# Patient Record
Sex: FEMALE | Race: WHITE | NOT HISPANIC OR LATINO | Employment: UNEMPLOYED | URBAN - NONMETROPOLITAN AREA
[De-identification: names, ages, dates, MRNs, and addresses within clinical notes are randomized per-mention and may not be internally consistent; named-entity substitution may affect disease eponyms.]

---

## 2017-08-18 ENCOUNTER — OFFICE VISIT (OUTPATIENT)
Dept: OTOLARYNGOLOGY | Facility: CLINIC | Age: 37
End: 2017-08-18

## 2017-08-18 VITALS
WEIGHT: 182 LBS | DIASTOLIC BLOOD PRESSURE: 96 MMHG | HEIGHT: 63 IN | SYSTOLIC BLOOD PRESSURE: 137 MMHG | TEMPERATURE: 97.7 F | BODY MASS INDEX: 32.25 KG/M2 | HEART RATE: 88 BPM

## 2017-08-18 DIAGNOSIS — J35.8 TONSILLAR CYST: Primary | ICD-10-CM

## 2017-08-18 PROCEDURE — 99203 OFFICE O/P NEW LOW 30 MIN: CPT | Performed by: PHYSICIAN ASSISTANT

## 2017-08-18 RX ORDER — PHENTERMINE HYDROCHLORIDE 37.5 MG/1
37.5 CAPSULE ORAL EVERY MORNING
COMMUNITY

## 2017-08-18 NOTE — PROGRESS NOTES
Patient Care Team:  MÓNICA Oliva as PCP - General (Family Medicine)  YOGI Silva as Physician Assistant (Otolaryngology)    Chief Complaint   Patient presents with   • lesion on back of throat        Subjective     Paula Desir is a 37 y.o. female who presents for evaluation.  HPI Comments: Patient presents for evaluation of throat problems. The patient has noted a lesion on the back of the throat about two weeks ago. She thought it may be a tonsil stone and tried to push it out without success. She does have a tobacco use history, but quit smoking over a year ago. The patient does state she can feel it in the back of her throat at times, but is is mild. No other concerns at this time.       Review of Systems  Review of Systems   Constitutional: Negative for activity change, appetite change, chills, diaphoresis, fatigue, fever and unexpected weight change.   HENT: Negative for congestion, dental problem, drooling, ear discharge, ear pain, facial swelling, hearing loss, mouth sores, nosebleeds, postnasal drip, rhinorrhea, sinus pressure, sneezing, sore throat, tinnitus, trouble swallowing and voice change.         Lesion on back of throat   Eyes: Negative.    Respiratory: Negative.    Cardiovascular: Negative.    Gastrointestinal: Negative.    Endocrine: Negative.    Skin: Negative.    Allergic/Immunologic: Negative for environmental allergies, food allergies and immunocompromised state.   Neurological: Negative.    Hematological: Negative.    Psychiatric/Behavioral: Negative.        History  History reviewed. No pertinent past medical history.  Past Surgical History:   Procedure Laterality Date   • ABDOMINAL SURGERY      tummy tuck   • APPENDECTOMY     •  SECTION     • CHOLECYSTECTOMY     • HYSTERECTOMY       Family History   Problem Relation Age of Onset   • Hypertension Mother      Social History   Substance Use Topics   • Smoking status: Former Smoker   • Smokeless tobacco:  None   • Alcohol use Yes      Comment: socially       Current Outpatient Prescriptions:   •  phentermine 37.5 MG capsule, Take 37.5 mg by mouth Every Morning., Disp: , Rfl:   Allergies:  Hepatitis b virus vaccine and Influenza vaccines    Objective     Vital Signs  Temp:  [97.7 °F (36.5 °C)] 97.7 °F (36.5 °C)  Heart Rate:  [88] 88  BP: (137)/(96) 137/96    Physical Exam:  Physical Exam   Constitutional: She is oriented to person, place, and time. Vital signs are normal. She appears well-developed and well-nourished. No distress.   HENT:   Head: Normocephalic and atraumatic.   Right Ear: Hearing, tympanic membrane, external ear and ear canal normal.   Left Ear: Hearing, tympanic membrane, external ear and ear canal normal.   Nose: Nose normal. No mucosal edema, rhinorrhea, nasal deformity or septal deviation.   Mouth/Throat: Uvula is midline, oropharynx is clear and moist and mucous membranes are normal. No oral lesions. No trismus in the jaw. No dental abscesses or uvula swelling. No oropharyngeal exudate, posterior oropharyngeal edema, posterior oropharyngeal erythema or tonsillar abscesses. No tonsillar exudate.       Eyes: Conjunctivae, EOM and lids are normal. Pupils are equal, round, and reactive to light. No scleral icterus.   Neck: Trachea normal and phonation normal. No tracheal tenderness present. No tracheal deviation present. No thyroid mass and no thyromegaly present.   Cardiovascular: Normal rate.    Pulmonary/Chest: Effort normal. No stridor. No respiratory distress.   Lymphadenopathy:        Head (right side): No submental, no submandibular, no tonsillar, no preauricular and no posterior auricular adenopathy present.        Head (left side): No submental, no submandibular, no tonsillar, no preauricular and no posterior auricular adenopathy present.     She has no cervical adenopathy.        Right cervical: No superficial cervical, no deep cervical and no posterior cervical adenopathy present.        Left cervical: No superficial cervical, no deep cervical and no posterior cervical adenopathy present.   Neurological: She is alert and oriented to person, place, and time. No cranial nerve deficit. Coordination and gait normal.   Skin: Skin is warm, dry and intact. No rash noted. She is not diaphoretic. No erythema. No pallor.   Psychiatric: She has a normal mood and affect. Her speech is normal and behavior is normal. Judgment and thought content normal. Cognition and memory are normal.   Vitals reviewed.      Results Review:   None    Assessment/Plan     Problems Addressed this Visit        Respiratory    Tonsillar cyst - Primary          My findings and recommendations were discussed and questions were answered. Will monitor conservatively, if enlarges or becomes bothersome, will consider surgical options.    Return in about 6 months (around 2/18/2018) for Recheck tonsil cyst.    YOGI Silva  08/18/17  10:42 AM